# Patient Record
Sex: FEMALE | Race: BLACK OR AFRICAN AMERICAN | NOT HISPANIC OR LATINO
[De-identification: names, ages, dates, MRNs, and addresses within clinical notes are randomized per-mention and may not be internally consistent; named-entity substitution may affect disease eponyms.]

---

## 2023-12-14 PROBLEM — Z00.00 ENCOUNTER FOR PREVENTIVE HEALTH EXAMINATION: Status: ACTIVE | Noted: 2023-12-14

## 2024-03-01 ENCOUNTER — APPOINTMENT (OUTPATIENT)
Dept: HEPATOLOGY | Facility: CLINIC | Age: 39
End: 2024-03-01
Payer: COMMERCIAL

## 2024-03-01 VITALS
RESPIRATION RATE: 16 BRPM | OXYGEN SATURATION: 92 % | TEMPERATURE: 97.9 F | BODY MASS INDEX: 22.73 KG/M2 | SYSTOLIC BLOOD PRESSURE: 125 MMHG | WEIGHT: 150 LBS | HEART RATE: 92 BPM | DIASTOLIC BLOOD PRESSURE: 82 MMHG | HEIGHT: 68 IN

## 2024-03-01 DIAGNOSIS — Z80.0 FAMILY HISTORY OF MALIGNANT NEOPLASM OF DIGESTIVE ORGANS: ICD-10-CM

## 2024-03-01 DIAGNOSIS — Z82.49 FAMILY HISTORY OF ISCHEMIC HEART DISEASE AND OTHER DISEASES OF THE CIRCULATORY SYSTEM: ICD-10-CM

## 2024-03-01 DIAGNOSIS — G43.909 MIGRAINE, UNSPECIFIED, NOT INTRACTABLE, W/OUT STATUS MIGRAINOSUS: ICD-10-CM

## 2024-03-01 DIAGNOSIS — Z83.49 FAMILY HISTORY OF OTHER ENDOCRINE, NUTRITIONAL AND METABOLIC DISEASES: ICD-10-CM

## 2024-03-01 DIAGNOSIS — J30.2 OTHER SEASONAL ALLERGIC RHINITIS: ICD-10-CM

## 2024-03-01 PROCEDURE — 99204 OFFICE O/P NEW MOD 45 MIN: CPT

## 2024-03-01 RX ORDER — MONTELUKAST 10 MG/1
10 TABLET, FILM COATED ORAL
Refills: 0 | Status: ACTIVE | COMMUNITY

## 2024-03-01 RX ORDER — FLUTICASONE PROPIONATE 50 MCG
50 SPRAY, SUSPENSION NASAL
Refills: 0 | Status: ACTIVE | COMMUNITY

## 2024-03-01 RX ORDER — VITAMIN B COMPLEX
CAPSULE ORAL
Refills: 0 | Status: ACTIVE | COMMUNITY

## 2024-03-01 RX ORDER — CHROMIUM 200 MCG
TABLET ORAL
Refills: 0 | Status: ACTIVE | COMMUNITY

## 2024-03-01 RX ORDER — ASCORBIC ACID 500 MG
TABLET ORAL
Refills: 0 | Status: ACTIVE | COMMUNITY

## 2024-03-02 LAB
A1AT SERPL-MCNC: 174 MG/DL
ALBUMIN SERPL ELPH-MCNC: 4.2 G/DL
ALP BLD-CCNC: 64 U/L
ALT SERPL-CCNC: 7 U/L
ANION GAP SERPL CALC-SCNC: 14 MMOL/L
APTT BLD: 32.4 SEC
AST SERPL-CCNC: 13 U/L
BILIRUB DIRECT SERPL-MCNC: 0.2 MG/DL
BILIRUB INDIRECT SERPL-MCNC: 0.5 MG/DL
BILIRUB SERPL-MCNC: 0.6 MG/DL
BUN SERPL-MCNC: 7 MG/DL
CALCIUM SERPL-MCNC: 9.5 MG/DL
CERULOPLASMIN SERPL-MCNC: 28 MG/DL
CHLORIDE SERPL-SCNC: 103 MMOL/L
CHOLEST SERPL-MCNC: 160 MG/DL
CK SERPL-CCNC: 85 U/L
CO2 SERPL-SCNC: 23 MMOL/L
CREAT SERPL-MCNC: 0.69 MG/DL
EGFR: 114 ML/MIN/1.73M2
ESTIMATED AVERAGE GLUCOSE: 103 MG/DL
FERRITIN SERPL-MCNC: 33 NG/ML
GLUCOSE SERPL-MCNC: 92 MG/DL
HBA1C MFR BLD HPLC: 5.2 %
HBV CORE IGG+IGM SER QL: NONREACTIVE
HBV SURFACE AB SER QL: REACTIVE
HBV SURFACE AG SER QL: NONREACTIVE
HCT VFR BLD CALC: 37.6 %
HCV AB SER QL: NONREACTIVE
HCV S/CO RATIO: 0.13 S/CO
HDLC SERPL-MCNC: 61 MG/DL
HEPATITIS A IGG ANTIBODY: REACTIVE
HGB BLD-MCNC: 11.9 G/DL
HIV1+2 AB SPEC QL IA.RAPID: NONREACTIVE
INR PPP: 1.04 RATIO
IRON SATN MFR SERPL: 15 %
IRON SERPL-MCNC: 50 UG/DL
LDLC SERPL CALC-MCNC: 88 MG/DL
MCHC RBC-ENTMCNC: 25.8 PG
MCHC RBC-ENTMCNC: 31.6 GM/DL
MCV RBC AUTO: 81.6 FL
NONHDLC SERPL-MCNC: 99 MG/DL
PLATELET # BLD AUTO: 283 K/UL
POTASSIUM SERPL-SCNC: 4 MMOL/L
PROT SERPL-MCNC: 7.8 G/DL
PT BLD: 11.7 SEC
RBC # BLD: 4.61 M/UL
RBC # FLD: 14.9 %
SODIUM SERPL-SCNC: 140 MMOL/L
TIBC SERPL-MCNC: 324 UG/DL
TRIGL SERPL-MCNC: 56 MG/DL
TSH SERPL-ACNC: 0.95 UIU/ML
UIBC SERPL-MCNC: 274 UG/DL
WBC # FLD AUTO: 4.42 K/UL

## 2024-03-03 NOTE — HISTORY OF PRESENT ILLNESS
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  Born in [facility], moved to [place] age 16. Last year travelled to [facility], [place] to [place].  [Household Contact to HBV] : no household contact to HBV [FreeTextEntry1] : 39 yo Female with Hx of seasonal allergies (on Montelukast occasionally), migraines (3-5 episodes per year), and no other known medical conditions was referred by PCP b/o elevated liver enzymes. No records, thus uncertain chronicity and pattern. No prior liver workup, no FHx of liver disease, denied toxic habits, reported taking pain medications for migraine but less than once a month.

## 2024-03-03 NOTE — REVIEW OF SYSTEMS
[Negative] : Respiratory [Fever] : no fever [Chills] : no chills [Dry Eyes] : no dryness of the eyes [Abdominal Pain] : no abdominal pain [Itching] : no itching [Dysuria] : no dysuria [Arthralgias] : no arthralgias [FreeTextEntry3] : wears glasses [Confused] : no confusion [FreeTextEntry8] : LMP 2/5/24 [FreeTextEntry4] : Frequent ear itching and sinusitis symptoms [FreeTextEntry7] : Abdominal pain only at time of period

## 2024-03-03 NOTE — ASSESSMENT
[FreeTextEntry1] : 37 yo Female with Hx of seasonal allergies (on Montelukast occasionally), migraines (3-5 episodes per year), and no other known medical conditions was referred by PCP b/o elevated liver enzymes. No records, thus uncertain chronicity and pattern. Patient is without prior history of liver disease and denies family history of liver disease or cancer. Patient denies alcohol consumption, any new prescription or OTC medications other than rare Excedrin, Ibuprofen or Tylenol for migraines. Denies any herbal supplements, any iv. or other drug use.  BMI is 22-23, no recent change in body weight, non-diabetic. Denies risk factors for viral hepatitis other than being from endemic area.  No stigmata of advanced chronic liver disease.  - Will obtain hepatitis serology (anti-HCV, HBsAg, HBcAb, HBsAb), iron studies (ferritin, transferrin saturation), IgG, DEBORAH, anti-SMA ab, anti LKM-ab, AMA, TSH, alpha-1-antitrypsin, ceruloplasmin, CPK, tTG-IgA, HIV - Will obtain abdominal US with Doppler - Will obtain FibroScan - Will check whether immune to HAV - Counseled on avoiding alcohol, avoiding smoking, avoiding herbal supplements, healthy lifestyle.  RTC w/ above results 3-4 weeks

## 2024-03-03 NOTE — PHYSICAL EXAM
[Non-Tender] : non-tender [General Appearance - Alert] : alert [General Appearance - In No Acute Distress] : in no acute distress [General Appearance - Well Nourished] : well nourished [General Appearance - Well Developed] : well developed [Sclera] : the sclera and conjunctiva were normal [Oropharynx] : the oropharynx was normal [Neck Appearance] : the appearance of the neck was normal [Jugular Venous Distention Increased] : there was no jugular-venous distention [Respiration, Rhythm And Depth] : normal respiratory rhythm and effort [Auscultation Breath Sounds / Voice Sounds] : lungs were clear to auscultation bilaterally [Exaggerated Use Of Accessory Muscles For Inspiration] : no accessory muscle use [Heart Rate And Rhythm] : heart rate was normal and rhythm regular [Heart Sounds] : normal S1 and S2 [Heart Sounds Gallop] : no gallops [Heart Sounds Pericardial Friction Rub] : no pericardial rub [Murmurs] : no murmurs [Abdomen Soft] : soft [Bowel Sounds] : normal bowel sounds [Edema] : there was no peripheral edema [Abdomen Tenderness] : non-tender [Abdomen Mass (___ Cm)] : no abdominal mass palpated [Abdomen Hernia] : no hernia was discovered [Cervical Lymph Nodes Enlarged Posterior Bilaterally] : posterior cervical [Supraclavicular Lymph Nodes Enlarged Bilaterally] : supraclavicular [Axillary Lymph Nodes Enlarged Bilaterally] : axillary [Cervical Lymph Nodes Enlarged Anterior Bilaterally] : anterior cervical [Femoral Lymph Nodes Enlarged Bilaterally] : femoral [Inguinal Lymph Nodes Enlarged Bilaterally] : inguinal [No Spinal Tenderness] : no spinal tenderness [No CVA Tenderness] : no ~M costovertebral angle tenderness [] : no rash [Abnormal Walk] : normal gait [Oriented To Time, Place, And Person] : oriented to person, place, and time [Impaired Insight] : insight and judgment were intact [Affect] : the affect was normal [Scleral Icterus] : No Scleral Icterus [Spider Angioma] : No spider angioma(s) were observed [Abdominal  Ascites] : no ascites [Asterixis] : no asterixis observed [Liver Palpable ___ Finger Breadths Below Costal Margin] : was not palpable below costal margin [Jaundice] : No jaundice [Palmar Erythema] : no Palmar Erythema [FreeTextEntry1] : Grossly intact

## 2024-03-05 LAB
ANA SER IF-ACNC: NEGATIVE
DEPRECATED KAPPA LC FREE/LAMBDA SER: 1.03 RATIO
IGA SER QL IEP: 420 MG/DL
IGG SER QL IEP: 1779 MG/DL
IGM SER QL IEP: 58 MG/DL
KAPPA LC CSF-MCNC: 2.04 MG/DL
KAPPA LC SERPL-MCNC: 2.1 MG/DL
LKM AB SER QL IF: <20.1 UNITS
MITOCHONDRIA AB SER IF-ACNC: NORMAL
SMOOTH MUSCLE AB SER QL IF: NORMAL
TTG IGA SER IA-ACNC: 1.6 U/ML
TTG IGA SER-ACNC: NEGATIVE

## 2024-03-21 ENCOUNTER — APPOINTMENT (OUTPATIENT)
Dept: HEPATOLOGY | Facility: CLINIC | Age: 39
End: 2024-03-21
Payer: COMMERCIAL

## 2024-03-21 DIAGNOSIS — R74.8 ABNORMAL LEVELS OF OTHER SERUM ENZYMES: ICD-10-CM

## 2024-03-21 PROCEDURE — 76981 USE PARENCHYMA: CPT

## 2024-03-29 ENCOUNTER — RESULT REVIEW (OUTPATIENT)
Age: 39
End: 2024-03-29

## 2024-03-29 ENCOUNTER — APPOINTMENT (OUTPATIENT)
Dept: ULTRASOUND IMAGING | Facility: CLINIC | Age: 39
End: 2024-03-29
Payer: COMMERCIAL

## 2024-03-29 PROCEDURE — 93975 VASCULAR STUDY: CPT

## 2024-03-29 PROCEDURE — 76700 US EXAM ABDOM COMPLETE: CPT | Mod: 59

## 2024-04-02 PROBLEM — R74.8 ELEVATED LIVER ENZYMES: Status: ACTIVE | Noted: 2024-03-01

## 2024-05-03 ENCOUNTER — APPOINTMENT (OUTPATIENT)
Dept: HEPATOLOGY | Facility: CLINIC | Age: 39
End: 2024-05-03

## 2024-07-12 ENCOUNTER — APPOINTMENT (OUTPATIENT)
Dept: HUMAN REPRODUCTION | Facility: CLINIC | Age: 39
End: 2024-07-12
Payer: COMMERCIAL

## 2024-07-12 PROCEDURE — 99459 PELVIC EXAMINATION: CPT

## 2024-07-12 PROCEDURE — 76830 TRANSVAGINAL US NON-OB: CPT

## 2024-07-12 PROCEDURE — 99205 OFFICE O/P NEW HI 60 MIN: CPT | Mod: 25

## 2024-07-12 PROCEDURE — 36415 COLL VENOUS BLD VENIPUNCTURE: CPT

## 2024-08-22 ENCOUNTER — APPOINTMENT (OUTPATIENT)
Dept: HUMAN REPRODUCTION | Facility: CLINIC | Age: 39
End: 2024-08-22
Payer: COMMERCIAL

## 2024-08-22 PROCEDURE — 99215 OFFICE O/P EST HI 40 MIN: CPT

## 2024-08-29 ENCOUNTER — APPOINTMENT (OUTPATIENT)
Dept: HUMAN REPRODUCTION | Facility: CLINIC | Age: 39
End: 2024-08-29
Payer: COMMERCIAL

## 2024-08-29 PROCEDURE — 74740 X-RAY FEMALE GENITAL TRACT: CPT

## 2024-08-29 PROCEDURE — 58340 CATHETER FOR HYSTEROGRAPHY: CPT

## 2024-08-29 PROCEDURE — 99213 OFFICE O/P EST LOW 20 MIN: CPT | Mod: 25

## 2024-08-29 PROCEDURE — 99459 PELVIC EXAMINATION: CPT

## 2024-08-29 PROCEDURE — 58999I: CUSTOM

## 2024-08-29 PROCEDURE — 76831 ECHO EXAM UTERUS: CPT

## 2024-11-05 ENCOUNTER — TRANSCRIPTION ENCOUNTER (OUTPATIENT)
Age: 39
End: 2024-11-05

## 2025-07-31 ENCOUNTER — NON-APPOINTMENT (OUTPATIENT)
Age: 40
End: 2025-07-31

## 2025-08-01 ENCOUNTER — APPOINTMENT (OUTPATIENT)
Dept: OBGYN | Facility: CLINIC | Age: 40
End: 2025-08-01
Payer: COMMERCIAL

## 2025-08-01 VITALS
HEIGHT: 65 IN | WEIGHT: 165 LBS | DIASTOLIC BLOOD PRESSURE: 78 MMHG | BODY MASS INDEX: 27.49 KG/M2 | SYSTOLIC BLOOD PRESSURE: 129 MMHG

## 2025-08-01 DIAGNOSIS — Z31.41 ENCOUNTER FOR FERTILITY TESTING: ICD-10-CM

## 2025-08-01 DIAGNOSIS — N84.0 POLYP OF CORPUS UTERI: ICD-10-CM

## 2025-08-01 PROCEDURE — 99203 OFFICE O/P NEW LOW 30 MIN: CPT

## 2025-08-02 LAB — ANTI-MUELLERIAN HORMONE: 0.42 NG/ML

## 2025-08-05 PROBLEM — N84.0 ENDOMETRIAL POLYP: Status: ACTIVE | Noted: 2025-08-05

## 2025-08-15 ENCOUNTER — OUTPATIENT (OUTPATIENT)
Dept: OUTPATIENT SERVICES | Facility: HOSPITAL | Age: 40
LOS: 1 days | End: 2025-08-15
Payer: COMMERCIAL

## 2025-08-15 VITALS
RESPIRATION RATE: 15 BRPM | OXYGEN SATURATION: 99 % | DIASTOLIC BLOOD PRESSURE: 82 MMHG | HEART RATE: 79 BPM | HEIGHT: 65 IN | SYSTOLIC BLOOD PRESSURE: 115 MMHG | TEMPERATURE: 98 F | WEIGHT: 160.06 LBS

## 2025-08-15 DIAGNOSIS — N84.0 POLYP OF CORPUS UTERI: ICD-10-CM

## 2025-08-15 DIAGNOSIS — Z01.818 ENCOUNTER FOR OTHER PREPROCEDURAL EXAMINATION: ICD-10-CM

## 2025-08-15 DIAGNOSIS — Z98.890 OTHER SPECIFIED POSTPROCEDURAL STATES: Chronic | ICD-10-CM

## 2025-08-15 DIAGNOSIS — Z92.89 PERSONAL HISTORY OF OTHER MEDICAL TREATMENT: Chronic | ICD-10-CM

## 2025-08-15 PROCEDURE — 36415 COLL VENOUS BLD VENIPUNCTURE: CPT

## 2025-08-15 PROCEDURE — G0463: CPT

## 2025-08-15 PROCEDURE — 85027 COMPLETE CBC AUTOMATED: CPT

## 2025-08-15 RX ORDER — LIDOCAINE HCL/PF 10 MG/ML
0.2 VIAL (ML) INJECTION ONCE
Refills: 0 | Status: DISCONTINUED | OUTPATIENT
Start: 2025-09-02 | End: 2025-09-16

## 2025-08-15 RX ORDER — SODIUM CHLORIDE 9 G/1000ML
1000 INJECTION, SOLUTION INTRAVENOUS
Refills: 0 | Status: DISCONTINUED | OUTPATIENT
Start: 2025-09-02 | End: 2025-09-16

## 2025-09-02 ENCOUNTER — RESULT REVIEW (OUTPATIENT)
Age: 40
End: 2025-09-02

## 2025-09-02 ENCOUNTER — TRANSCRIPTION ENCOUNTER (OUTPATIENT)
Age: 40
End: 2025-09-02

## 2025-09-02 ENCOUNTER — APPOINTMENT (OUTPATIENT)
Dept: OBGYN | Facility: HOSPITAL | Age: 40
End: 2025-09-02
Payer: COMMERCIAL

## 2025-09-02 ENCOUNTER — OUTPATIENT (OUTPATIENT)
Dept: OUTPATIENT SERVICES | Facility: HOSPITAL | Age: 40
LOS: 1 days | End: 2025-09-02
Payer: COMMERCIAL

## 2025-09-02 VITALS
SYSTOLIC BLOOD PRESSURE: 115 MMHG | OXYGEN SATURATION: 100 % | RESPIRATION RATE: 17 BRPM | HEART RATE: 80 BPM | DIASTOLIC BLOOD PRESSURE: 76 MMHG

## 2025-09-02 VITALS
WEIGHT: 160.06 LBS | HEART RATE: 87 BPM | SYSTOLIC BLOOD PRESSURE: 107 MMHG | DIASTOLIC BLOOD PRESSURE: 70 MMHG | OXYGEN SATURATION: 100 % | RESPIRATION RATE: 16 BRPM | TEMPERATURE: 97 F | HEIGHT: 65 IN

## 2025-09-02 DIAGNOSIS — N84.0 POLYP OF CORPUS UTERI: ICD-10-CM

## 2025-09-02 DIAGNOSIS — Z92.89 PERSONAL HISTORY OF OTHER MEDICAL TREATMENT: Chronic | ICD-10-CM

## 2025-09-02 DIAGNOSIS — Z98.890 OTHER SPECIFIED POSTPROCEDURAL STATES: Chronic | ICD-10-CM

## 2025-09-02 PROCEDURE — 88305 TISSUE EXAM BY PATHOLOGIST: CPT

## 2025-09-02 PROCEDURE — 58558 HYSTEROSCOPY BIOPSY: CPT

## 2025-09-02 PROCEDURE — 88305 TISSUE EXAM BY PATHOLOGIST: CPT | Mod: 26

## 2025-09-02 RX ORDER — IBUPROFEN 200 MG
1 TABLET ORAL
Qty: 0 | Refills: 0 | DISCHARGE

## 2025-09-02 RX ORDER — ONDANSETRON HCL/PF 4 MG/2 ML
4 VIAL (ML) INJECTION ONCE
Refills: 0 | Status: DISCONTINUED | OUTPATIENT
Start: 2025-09-02 | End: 2025-09-16

## 2025-09-02 RX ORDER — SODIUM CHLORIDE 9 G/1000ML
1000 INJECTION, SOLUTION INTRAVENOUS
Refills: 0 | Status: DISCONTINUED | OUTPATIENT
Start: 2025-09-02 | End: 2025-09-16

## 2025-09-02 RX ORDER — HYDROMORPHONE/SOD CHLOR,ISO/PF 2 MG/10 ML
1 SYRINGE (ML) INJECTION
Refills: 0 | Status: DISCONTINUED | OUTPATIENT
Start: 2025-09-02 | End: 2025-09-02

## 2025-09-02 RX ORDER — HYDROMORPHONE/SOD CHLOR,ISO/PF 2 MG/10 ML
0.5 SYRINGE (ML) INJECTION
Refills: 0 | Status: DISCONTINUED | OUTPATIENT
Start: 2025-09-02 | End: 2025-09-02

## 2025-09-02 RX ORDER — ACETAMINOPHEN 500 MG/5ML
3 LIQUID (ML) ORAL
Qty: 0 | Refills: 0 | DISCHARGE

## 2025-09-02 RX ADMIN — SODIUM CHLORIDE 100 MILLILITER(S): 9 INJECTION, SOLUTION INTRAVENOUS at 09:18

## 2025-09-05 LAB — SURGICAL PATHOLOGY STUDY: SIGNIFICANT CHANGE UP

## 2025-09-17 ENCOUNTER — APPOINTMENT (OUTPATIENT)
Dept: OBGYN | Facility: CLINIC | Age: 40
End: 2025-09-17
Payer: COMMERCIAL

## 2025-09-17 VITALS
WEIGHT: 167 LBS | BODY MASS INDEX: 27.82 KG/M2 | HEIGHT: 65 IN | DIASTOLIC BLOOD PRESSURE: 72 MMHG | SYSTOLIC BLOOD PRESSURE: 106 MMHG

## 2025-09-17 PROCEDURE — 99212 OFFICE O/P EST SF 10 MIN: CPT

## (undated) DEVICE — SOL IRR POUR NS 0.9% 500ML

## (undated) DEVICE — VENODYNE/SCD SLEEVE CALF MEDIUM

## (undated) DEVICE — PRESSURE INFUSOR BAG 1000ML

## (undated) DEVICE — WARMING BLANKET UPPER ADULT

## (undated) DEVICE — DRSG TELFA 3 X 8

## (undated) DEVICE — PREP BETADINE KIT

## (undated) DEVICE — PACK LITHOTOMY

## (undated) DEVICE — GLV 6.5 PROTEXIS (BLUE)

## (undated) DEVICE — POSITIONER FOAM EGG CRATE ULNAR 2PCS (PINK)

## (undated) DEVICE — TUBING FLUID ADMINISTRATION SET PRIM 70"

## (undated) DEVICE — DRAPE LIGHT HANDLE COVER (GREEN)

## (undated) DEVICE — SPECIMEN CONTAINER 100ML

## (undated) DEVICE — ELCTR CUTTING LOOP 24FR 12 DEG 0.35 WIRE

## (undated) DEVICE — Device

## (undated) DEVICE — SOL IRR BAG NS 0.9% 1000ML